# Patient Record
Sex: FEMALE | HISPANIC OR LATINO | Employment: FULL TIME | URBAN - METROPOLITAN AREA
[De-identification: names, ages, dates, MRNs, and addresses within clinical notes are randomized per-mention and may not be internally consistent; named-entity substitution may affect disease eponyms.]

---

## 2019-01-20 ENCOUNTER — HOSPITAL ENCOUNTER (EMERGENCY)
Facility: HOSPITAL | Age: 59
Discharge: HOME | End: 2019-01-20
Attending: EMERGENCY MEDICINE
Payer: COMMERCIAL

## 2019-01-20 VITALS
HEIGHT: 60 IN | RESPIRATION RATE: 16 BRPM | SYSTOLIC BLOOD PRESSURE: 122 MMHG | HEART RATE: 75 BPM | OXYGEN SATURATION: 99 % | WEIGHT: 180 LBS | TEMPERATURE: 99 F | BODY MASS INDEX: 35.34 KG/M2 | DIASTOLIC BLOOD PRESSURE: 75 MMHG

## 2019-01-20 DIAGNOSIS — T14.8XXA MUSCLE STRAIN: Primary | ICD-10-CM

## 2019-01-20 DIAGNOSIS — M79.603 ARM PAIN: ICD-10-CM

## 2019-01-20 DIAGNOSIS — M79.602 LEFT ARM PAIN: ICD-10-CM

## 2019-01-20 PROCEDURE — 25000003 PHARM REV CODE 250: Performed by: PHYSICIAN ASSISTANT

## 2019-01-20 PROCEDURE — 99284 EMERGENCY DEPT VISIT MOD MDM: CPT | Mod: ,,, | Performed by: EMERGENCY MEDICINE

## 2019-01-20 PROCEDURE — 93010 EKG 12-LEAD: ICD-10-PCS | Mod: ,,, | Performed by: INTERNAL MEDICINE

## 2019-01-20 PROCEDURE — 93005 ELECTROCARDIOGRAM TRACING: CPT

## 2019-01-20 PROCEDURE — 99284 PR EMERGENCY DEPT VISIT,LEVEL IV: ICD-10-PCS | Mod: ,,, | Performed by: EMERGENCY MEDICINE

## 2019-01-20 PROCEDURE — 63600175 PHARM REV CODE 636 W HCPCS: Performed by: PHYSICIAN ASSISTANT

## 2019-01-20 PROCEDURE — 96372 THER/PROPH/DIAG INJ SC/IM: CPT

## 2019-01-20 PROCEDURE — 93010 ELECTROCARDIOGRAM REPORT: CPT | Mod: ,,, | Performed by: INTERNAL MEDICINE

## 2019-01-20 PROCEDURE — 99284 EMERGENCY DEPT VISIT MOD MDM: CPT | Mod: 25

## 2019-01-20 RX ORDER — KETOROLAC TROMETHAMINE 30 MG/ML
10 INJECTION, SOLUTION INTRAMUSCULAR; INTRAVENOUS
Status: COMPLETED | OUTPATIENT
Start: 2019-01-20 | End: 2019-01-20

## 2019-01-20 RX ORDER — LIDOCAINE 50 MG/G
1 PATCH TOPICAL
Status: DISCONTINUED | OUTPATIENT
Start: 2019-01-20 | End: 2019-01-20 | Stop reason: HOSPADM

## 2019-01-20 RX ADMIN — LIDOCAINE 1 PATCH: 50 PATCH TOPICAL at 06:01

## 2019-01-20 RX ADMIN — KETOROLAC TROMETHAMINE 10 MG: 30 INJECTION, SOLUTION INTRAMUSCULAR at 06:01

## 2019-01-21 NOTE — ED PROVIDER NOTES
Encounter Date: 1/20/2019       History     Chief Complaint   Patient presents with    Arm Pain     L upper arm pain, L upper back, neck hurts turn to L , fingers feel cold,, denies cardiac hx, had epidural injection in my upper back 2 yrs ago     Patient is a 4-year-old female with history of anxiety, depression and hypertension is presenting to the ER for evaluation of left arm pain.  Patient states symptoms started at around 11 am today.  She states that the pain starts at her left upper arm and radiates down.  She states she has also noticed some numbness and tingling to her left upper extremity.  She denies any injury trauma or fall.  Patient denies any neck pain or upper back pain at this time.  She denies headache, blurred vision, chest pain or palpitations.  No prior cardiac history including stent placement.  She states that this pain feels similar to the radiculopathy that she had in her lumbar spine a few years ago.  Patient did not take any medication prior to arrival to the ED.      The history is provided by the patient.     Review of patient's allergies indicates:  No Known Allergies  Past Medical History:   Diagnosis Date    Anxiety     Depression     Hypertension      History reviewed. No pertinent surgical history.  History reviewed. No pertinent family history.  Social History     Tobacco Use    Smoking status: Never Smoker    Smokeless tobacco: Never Used   Substance Use Topics    Alcohol use: Yes     Alcohol/week: 7.2 oz     Types: 12 Standard drinks or equivalent per week    Drug use: No     Review of Systems   Constitutional: Negative for chills and fever.   HENT: Negative for congestion.    Respiratory: Negative for cough and shortness of breath.    Cardiovascular: Negative for chest pain and palpitations.   Gastrointestinal: Negative for abdominal pain, nausea and vomiting.   Genitourinary: Negative for dysuria.   Musculoskeletal: Positive for arthralgias and myalgias. Negative for  back pain, gait problem, joint swelling, neck pain and neck stiffness.   Skin: Negative for rash and wound.   Allergic/Immunologic: Negative for immunocompromised state.   Neurological: Positive for numbness. Negative for dizziness, weakness and light-headedness.   Hematological: Does not bruise/bleed easily.   Psychiatric/Behavioral: Negative for confusion.       Physical Exam     Initial Vitals [01/20/19 1800]   BP Pulse Resp Temp SpO2   (!) 158/88 78 18 98.4 °F (36.9 °C) 99 %      MAP       --         Physical Exam    Vitals reviewed.  Constitutional: She appears well-developed and well-nourished. She is not diaphoretic. No distress.   HENT:   Head: Normocephalic and atraumatic.   Mouth/Throat: Oropharynx is clear and moist.   Eyes: Conjunctivae and EOM are normal.   Neck: Neck supple.   Cardiovascular: Normal rate, regular rhythm, normal heart sounds, intact distal pulses and normal pulses.   Pulmonary/Chest: Breath sounds normal.   Musculoskeletal:        Left shoulder: She exhibits normal range of motion, no tenderness, no bony tenderness and no swelling.        Left elbow: She exhibits normal range of motion and no swelling. Tenderness found.        Arms:  Neurological: She is alert and oriented to person, place, and time. She has normal strength. No sensory deficit. She exhibits normal muscle tone.   Skin: Skin is warm and dry.         ED Course   Procedures  Labs Reviewed - No data to display       Imaging Results          X-Ray Elbow Complete Left (Final result)  Result time 01/20/19 19:53:53    Final result by Jerod Whalen MD (01/20/19 19:53:53)                 Impression:      No acute displaced fracture-dislocation identified.      Electronically signed by: Jerod Whalen MD  Date:    01/20/2019  Time:    19:53             Narrative:    EXAMINATION:  XR ELBOW COMPLETE 3 VIEW LEFT    CLINICAL HISTORY:  Pain in left arm    TECHNIQUE:  AP, lateral, and oblique views of the left elbow were  performed.    COMPARISON:  None    FINDINGS:  Bones are well mineralized. Alignment is within normal limits.  No displaced fracture, dislocation or destructive osseous process.  No large elbow joint effusion.  Joint spaces appear relatively maintained.  No subcutaneous emphysema or radiodense retained foreign body.                                       APC / Resident Notes:   Patient seen in the ER promptly upon arrival.  She is afebrile no acute distress.  No neurological deficits on examination. Patient has equal pulses bilaterally. She does have some tenderness to palpation over the bicipital insertion.  Likely secondary to tendinitis versus muscular strain.  Patient given lidocaine patch and Toradol in ED.  Will obtain x-ray.    X-ray of found to be unremarkable for acute abnormality.  Patient advised to take Tylenol or Motrin for discomfort.  Advised to reduce heavy lifting or strenuous activities.  She is to follow up with her family doctor this week.  Stable for discharge and close follow-up.  Given strict return precautions ED. The care of this patient was overseen by attending physician who agrees with treatment, plan, and disposition.                   Clinical Impression:   The primary encounter diagnosis was Muscle strain. Diagnoses of Arm pain and Left arm pain were also pertinent to this visit.      Disposition:   Disposition: Discharged  Condition: Stable                        Anuapma Hernandez PA-C  01/20/19 2025

## 2019-01-21 NOTE — DISCHARGE INSTRUCTIONS
Tylenol Motrin lidocaine patch or cream as needed over-the-counter for pain.  Please return for any worsening or concerns.  Please follow up with primary care.

## 2019-01-21 NOTE — ED NOTES
Patient received with complaint of constant left arm pain and numbness onset 1100 today. Denies current shortness of breath; Denies chest pain.      No LDA's in place on arrival to department.    Mother is present.    Pain:  Rated 6/10.    Psychosocial:  Patient is calm and cooperative.  Patients insight and judgement are appropriate to situation.  Appears clean, well maintained, with clothing appropriate to environment.  No evidence of hallucinations, delusions, or psychosis.    Neuro:  Eyes open spontaneously.  Awake, alert.  Oriented x 4.  Speech clear and appropriate.  Tolerating saliva secretions well.  Able to follow commands, demonstrating ability to actively and appropriately communicate within context of current conversation.  Symmetrical facial muscles.  Moving all extremities well with no noted weakness.  Adequate muscle tone present.  Movement in purposeful.  No evidence of impaired sensation.  Pupils equally round and reactive to light.  No noted drifts.    Airway:  Bilateral chest rise and fall.  RR regular and non labored.  Air entry patent and clear x 5 lobes of the lungs.     Circulatory:  Skin warm, dry, and pink.  Apical and radial pulses strong and regular.  A few irregular beats noted.    Abdomen:  Abdomen soft and non distended.      Urinary:  Patient reports urination without pain.

## 2020-07-14 ENCOUNTER — LAB VISIT (OUTPATIENT)
Dept: PRIMARY CARE CLINIC | Facility: OTHER | Age: 60
End: 2020-07-14
Attending: INTERNAL MEDICINE
Payer: OTHER GOVERNMENT

## 2020-07-14 DIAGNOSIS — Z03.818 ENCOUNTER FOR OBSERVATION FOR SUSPECTED EXPOSURE TO OTHER BIOLOGICAL AGENTS RULED OUT: ICD-10-CM

## 2020-07-14 PROCEDURE — U0003 INFECTIOUS AGENT DETECTION BY NUCLEIC ACID (DNA OR RNA); SEVERE ACUTE RESPIRATORY SYNDROME CORONAVIRUS 2 (SARS-COV-2) (CORONAVIRUS DISEASE [COVID-19]), AMPLIFIED PROBE TECHNIQUE, MAKING USE OF HIGH THROUGHPUT TECHNOLOGIES AS DESCRIBED BY CMS-2020-01-R: HCPCS

## 2020-07-18 LAB — SARS-COV-2 RNA RESP QL NAA+PROBE: NOT DETECTED

## 2021-04-15 ENCOUNTER — PATIENT MESSAGE (OUTPATIENT)
Dept: RESEARCH | Facility: HOSPITAL | Age: 61
End: 2021-04-15

## 2024-01-17 ENCOUNTER — TELEPHONE (OUTPATIENT)
Dept: PRIMARY CARE CLINIC | Facility: CLINIC | Age: 64
End: 2024-01-17
Payer: COMMERCIAL

## 2024-01-17 NOTE — TELEPHONE ENCOUNTER
Pt states that she was a pt of yours at  and wanted to know if you would take her back as a patient

## 2024-01-17 NOTE — TELEPHONE ENCOUNTER
----- Message from Yulisa Lucas sent at 1/17/2024  2:22 PM CST -----  Contact: Pt  [phone]  Pt called and stated that she was a pt of yours at  and wanted to know if you would take her back as a NP.     Please call